# Patient Record
Sex: MALE | Race: WHITE | NOT HISPANIC OR LATINO | Employment: FULL TIME | URBAN - METROPOLITAN AREA
[De-identification: names, ages, dates, MRNs, and addresses within clinical notes are randomized per-mention and may not be internally consistent; named-entity substitution may affect disease eponyms.]

---

## 2019-07-06 ENCOUNTER — APPOINTMENT (EMERGENCY)
Dept: RADIOLOGY | Facility: HOSPITAL | Age: 44
End: 2019-07-06
Payer: OTHER MISCELLANEOUS

## 2019-07-06 ENCOUNTER — HOSPITAL ENCOUNTER (EMERGENCY)
Facility: HOSPITAL | Age: 44
Discharge: HOME | End: 2019-07-06
Attending: EMERGENCY MEDICINE | Admitting: EMERGENCY MEDICINE
Payer: OTHER MISCELLANEOUS

## 2019-07-06 VITALS
WEIGHT: 210 LBS | HEART RATE: 92 BPM | RESPIRATION RATE: 20 BRPM | DIASTOLIC BLOOD PRESSURE: 89 MMHG | OXYGEN SATURATION: 100 % | SYSTOLIC BLOOD PRESSURE: 151 MMHG | TEMPERATURE: 98 F

## 2019-07-06 DIAGNOSIS — M54.30 SCIATICA: ICD-10-CM
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
## 2019-07-06 DIAGNOSIS — M54.50 LOW BACK PAIN: ICD-10-CM

## 2019-07-06 LAB
BILIRUB UR QL STRIP: NEGATIVE
CLARITY UR: CLEAR
COLOR UR: YELLOW
GLUCOSE UR STRIP-MCNC: NEGATIVE MG/DL
HGB UR QL STRIP.AUTO: NEGATIVE
KETONES UR STRIP-MCNC: NEGATIVE MG/DL
LEUKOCYTE ESTERASE UR QL STRIP: NEGATIVE
NITRITE UR QL STRIP: NEGATIVE
PH UR STRIP.AUTO: 6 [PH]
PROT UR STRIP-MCNC: NEGATIVE MG/DL
SP GR UR STRIP.AUTO: 1.02 (ref 1–1.04)
UROBILINOGEN UA: NEGATIVE MG/DL

## 2019-07-06 PROCEDURE — 99283 EMERGENCY DEPT VISIT LOW MDM: CPT | Performed by: PHYSICIAN ASSISTANT

## 2019-07-06 PROCEDURE — 81003 URINALYSIS AUTO W/O SCOPE: CPT | Performed by: PHYSICIAN ASSISTANT

## 2019-07-06 PROCEDURE — 99284 EMERGENCY DEPT VISIT MOD MDM: CPT

## 2019-07-06 PROCEDURE — 72100 X-RAY EXAM L-S SPINE 2/3 VWS: CPT

## 2019-07-06 RX ORDER — METHOCARBAMOL 500 MG/1
500 TABLET, FILM COATED ORAL 2 TIMES DAILY
Qty: 20 TABLET | Refills: 0 | Status: SHIPPED | OUTPATIENT
Start: 2019-07-06

## 2019-07-06 RX ORDER — IBUPROFEN 600 MG/1
600 TABLET ORAL EVERY 6 HOURS PRN
Qty: 30 TABLET | Refills: 0 | Status: SHIPPED | OUTPATIENT
Start: 2019-07-06

## 2019-07-06 RX ORDER — ASCORBIC ACID 100 MG
TABLET,CHEWABLE ORAL
COMMUNITY

## 2019-07-06 RX ORDER — ALBUTEROL SULFATE 90 UG/1
AEROSOL, METERED RESPIRATORY (INHALATION)
COMMUNITY
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
## 2019-07-06 NOTE — ED ATTENDING ATTESTATION
I, 22 Naun Blackwell DO, saw and evaluated the patient  I have discussed the patient with the resident/non-physician practitioner and agree with the resident's/non-physician practitioner's findings, Plan of Care, and MDM as documented in the resident's/non-physician practitioner's note, except where noted  All available labs and Radiology studies were reviewed  I was present for key portions of any procedure(s) performed by the resident/non-physician practitioner and I was immediately available to provide assistance  At this point I agree with the current assessment done in the Emergency Department    I have conducted an independent evaluation of this patient a history and physical is as follows:      Critical Care Time  Procedures

## 2022-01-18 ENCOUNTER — APPOINTMENT (EMERGENCY)
Dept: RADIOLOGY | Facility: HOSPITAL | Age: 47
End: 2022-01-18
Payer: COMMERCIAL

## 2022-01-18 ENCOUNTER — HOSPITAL ENCOUNTER (EMERGENCY)
Facility: HOSPITAL | Age: 47
Discharge: HOME | End: 2022-01-18
Attending: EMERGENCY MEDICINE
Payer: COMMERCIAL

## 2022-01-18 VITALS
TEMPERATURE: 98.6 F | OXYGEN SATURATION: 99 % | HEART RATE: 97 BPM | RESPIRATION RATE: 18 BRPM | DIASTOLIC BLOOD PRESSURE: 96 MMHG | SYSTOLIC BLOOD PRESSURE: 158 MMHG

## 2022-01-18 DIAGNOSIS — S90.122A CONTUSION OF LESSER TOE OF LEFT FOOT WITHOUT DAMAGE TO NAIL, INITIAL ENCOUNTER: Primary | ICD-10-CM

## 2022-01-18 PROCEDURE — 99284 EMERGENCY DEPT VISIT MOD MDM: CPT | Performed by: EMERGENCY MEDICINE

## 2022-01-18 PROCEDURE — 73630 X-RAY EXAM OF FOOT: CPT

## 2022-01-18 PROCEDURE — 99283 EMERGENCY DEPT VISIT LOW MDM: CPT

## 2022-01-18 NOTE — Clinical Note
Delmer Real was seen and treated in our emergency department on 1/18/2022  Diagnosis:     Alva Cleaning  may return to work on return date  He may return on this date: 01/18/2022         If you have any questions or concerns, please don't hesitate to call        Alicia Sinclair MD    ______________________________           _______________          _______________  Hospital Representative                              Date                                Time

## 2022-01-18 NOTE — ED PROVIDER NOTES
History  Chief Complaint   Patient presents with    Toe Injury     Left 5th digit injury occurred today  No meds PTA  27-year-old male with no significant past medical history presenting for evaluation of left toe injury  Patient is a  and was working when a litter ran over his toe  Patient has pain and ecchymosis to his left 5th toe that is worse with movement  He is able to ambulate  No other additional trauma or injury  He did not take anything for pain prior to arrival           Prior to Admission Medications   Prescriptions Last Dose Informant Patient Reported? Taking? Ascorbic Acid (VITAMIN C) 100 MG CHEW   Yes Yes   Sig: Vitamin C   Cholecalciferol (VITAMIN D3 PO)   Yes Yes   Sig: Vitamin D3   CoenzymeQ10-Isoleucine-Glycine (CO Q-10) 100-50-25 MG TB24   Yes Yes   Sig: Co Q-10   Multiple Vitamins-Minerals (DAILY MULTIVITAMIN PO)   Yes Yes   Sig: Daily Multivitamin   Omega-3 Fatty Acids (FISH OIL OMEGA-3 PO)   Yes Yes   Sig: Take 2 g by mouth daily   albuterol (PROAIR HFA) 90 mcg/act inhaler Not Taking at Unknown time  Yes No   Sig: ProAir HFA 90 mcg/actuation aerosol inhaler   Patient not taking: Reported on 1/18/2022   ibuprofen (MOTRIN) 600 mg tablet Not Taking at Unknown time  No No   Sig: Take 1 tablet (600 mg total) by mouth every 6 (six) hours as needed for mild pain   Patient not taking: Reported on 1/18/2022    methocarbamol (ROBAXIN) 500 mg tablet Not Taking at Unknown time  No No   Sig: Take 1 tablet (500 mg total) by mouth 2 (two) times a day   Patient not taking: Reported on 1/18/2022       Facility-Administered Medications: None       Past Medical History:   Diagnosis Date    Back injury        History reviewed  No pertinent surgical history  History reviewed  No pertinent family history  I have reviewed and agree with the history as documented      E-Cigarette/Vaping     E-Cigarette/Vaping Substances     Social History     Tobacco Use    Smoking status: Never Smoker  Smokeless tobacco: Never Used   Substance Use Topics    Alcohol use: Yes     Comment: "occasionally"    Drug use: Never       Review of Systems   Constitutional: Negative for fever  HENT: Negative for congestion, rhinorrhea and sore throat  Eyes: Negative for visual disturbance  Respiratory: Negative for cough and shortness of breath  Cardiovascular: Negative for chest pain, palpitations and leg swelling  Gastrointestinal: Negative for abdominal pain, diarrhea, nausea and vomiting  Genitourinary: Negative for dysuria and hematuria  Musculoskeletal: Positive for arthralgias  Negative for myalgias  Skin: Negative for color change and rash  Neurological: Negative for weakness and numbness  Hematological: Does not bruise/bleed easily  Physical Exam  Physical Exam  Vitals and nursing note reviewed  Constitutional:       General: He is not in acute distress  Appearance: Normal appearance  HENT:      Head: Normocephalic and atraumatic  Nose: Nose normal    Eyes:      General: No scleral icterus  Extraocular Movements: Extraocular movements intact  Conjunctiva/sclera: Conjunctivae normal       Pupils: Pupils are equal, round, and reactive to light  Cardiovascular:      Rate and Rhythm: Normal rate and regular rhythm  Pulmonary:      Effort: Pulmonary effort is normal  No respiratory distress  Breath sounds: No wheezing, rhonchi or rales  Abdominal:      General: There is no distension  Palpations: Abdomen is soft  Musculoskeletal:         General: Swelling (left fifth toe) and tenderness (left fifth toe, DIP joint) present  No deformity  Cervical back: Neck supple  Right lower leg: No edema  Left lower leg: No edema  Comments: No tenderness to base of first or fifth metatarsals  No ankle, tibial, or fibula tenderness  Good distal pulses and capillary refill   Skin:     General: Skin is warm and dry        Findings: Bruising (left medial 5th toe) present  No rash  Neurological:      General: No focal deficit present  Mental Status: He is alert and oriented to person, place, and time  Mental status is at baseline  Motor: No weakness  Gait: Gait abnormal (antalgic)  Psychiatric:         Mood and Affect: Mood normal          Behavior: Behavior normal          Vital Signs  ED Triage Vitals [01/18/22 1133]   Temperature Pulse Respirations Blood Pressure SpO2   98 6 °F (37 °C) 97 18 158/96 99 %      Temp Source Heart Rate Source Patient Position - Orthostatic VS BP Location FiO2 (%)   Tympanic Monitor Sitting Right arm --      Pain Score       --           Vitals:    01/18/22 1133   BP: 158/96   Pulse: 97   Patient Position - Orthostatic VS: Sitting         Visual Acuity      ED Medications  Medications - No data to display    Diagnostic Studies  Results Reviewed     None                 XR foot 3+ views LEFT   ED Interpretation by Leo Barahona MD (01/18 1202)   No acute fracture                 Procedures  Procedures         ED Course                               SBIRT 22yo+      Most Recent Value   SBIRT (25 yo +)    In order to provide better care to our patients, we are screening all of our patients for alcohol and drug use  Would it be okay to ask you these screening questions? No Filed at: 01/18/2022 1141                    MDM  Number of Diagnoses or Management Options  Contusion of lesser toe of left foot without damage to nail, initial encounter  Diagnosis management comments: 51-year-old male presenting for evaluation of traumatic toe injury  Differential diagnosis includes fracture, dislocation, contusion, soft tissue injury  Patient has an intact neurovascular examination  X-rays were obtained and per my interpretation are negative for acute fracture  These are notable for osteoarthritic changes  Final interpretation by radiology is pending  Patient offered pain medication but declined    He is appropriate for discharge home with outpatient follow-up  Patient is medically appropriate to return to work without restrictions  Return precautions discussed  Patient counseled on rest, ice, elevation, symptomatic management at home  He is in agreement and understanding of these instructions  Disposition  Final diagnoses:   Contusion of lesser toe of left foot without damage to nail, initial encounter     Time reflects when diagnosis was documented in both MDM as applicable and the Disposition within this note     Time User Action Codes Description Comment    1/18/2022 12:05 PM Martha Huddleston Add [X70 122A] Contusion of lesser toe of left foot without damage to nail, initial encounter       ED Disposition     ED Disposition Condition Date/Time Comment    Discharge Stable Tue Jan 18, 2022 12:05 PM Melodie Pineda discharge to home/self care              Follow-up Information     Follow up With Specialties Details Why Contact Info    Follow up with your primary care doctor as needed              Discharge Medication List as of 1/18/2022 12:06 PM      CONTINUE these medications which have NOT CHANGED    Details   Ascorbic Acid (VITAMIN C) 100 MG CHEW Vitamin C, Historical Med      Cholecalciferol (VITAMIN D3 PO) Vitamin D3, Historical Med      CoenzymeQ10-Isoleucine-Glycine (CO Q-10) 100-50-25 MG TB24 Co Q-10, Historical Med      Multiple Vitamins-Minerals (DAILY MULTIVITAMIN PO) Daily Multivitamin, Historical Med      Omega-3 Fatty Acids (FISH OIL OMEGA-3 PO) Take 2 g by mouth daily, Historical Med      albuterol (PROAIR HFA) 90 mcg/act inhaler ProAir HFA 90 mcg/actuation aerosol inhaler, Historical Med      ibuprofen (MOTRIN) 600 mg tablet Take 1 tablet (600 mg total) by mouth every 6 (six) hours as needed for mild pain, Starting Sat 7/6/2019, Print      methocarbamol (ROBAXIN) 500 mg tablet Take 1 tablet (500 mg total) by mouth 2 (two) times a day, Starting Sat 7/6/2019, Print             No discharge procedures on file      PDMP Review     None          ED Provider  Electronically Signed by           Annemarie Lu MD  01/18/22 4592

## 2023-12-21 ENCOUNTER — OCCMED (OUTPATIENT)
Dept: URGENT CARE | Facility: CLINIC | Age: 48
End: 2023-12-21
Payer: OTHER MISCELLANEOUS

## 2023-12-21 DIAGNOSIS — Z20.1 EXPOSURE TO TB: Primary | ICD-10-CM

## 2023-12-21 PROCEDURE — G0382 LEV 3 HOSP TYPE B ED VISIT: HCPCS | Performed by: PHYSICIAN ASSISTANT

## 2023-12-21 PROCEDURE — 86480 TB TEST CELL IMMUN MEASURE: CPT | Performed by: PHYSICIAN ASSISTANT

## 2023-12-21 PROCEDURE — 99283 EMERGENCY DEPT VISIT LOW MDM: CPT | Performed by: PHYSICIAN ASSISTANT

## 2023-12-22 LAB
GAMMA INTERFERON BACKGROUND BLD IA-ACNC: 0 IU/ML
M TB IFN-G BLD-IMP: NEGATIVE
M TB IFN-G CD4+ BCKGRND COR BLD-ACNC: 0 IU/ML
M TB IFN-G CD4+ BCKGRND COR BLD-ACNC: 0 IU/ML
MITOGEN IGNF BCKGRD COR BLD-ACNC: 8.01 IU/ML

## 2024-06-24 ENCOUNTER — HOSPITAL ENCOUNTER (EMERGENCY)
Facility: HOSPITAL | Age: 49
Discharge: HOME | End: 2024-06-24
Attending: EMERGENCY MEDICINE | Admitting: EMERGENCY MEDICINE
Payer: OTHER MISCELLANEOUS

## 2024-06-24 ENCOUNTER — APPOINTMENT (EMERGENCY)
Dept: RADIOLOGY | Facility: HOSPITAL | Age: 49
End: 2024-06-24
Payer: OTHER MISCELLANEOUS

## 2024-06-24 VITALS
DIASTOLIC BLOOD PRESSURE: 102 MMHG | TEMPERATURE: 98 F | SYSTOLIC BLOOD PRESSURE: 159 MMHG | HEART RATE: 72 BPM | RESPIRATION RATE: 18 BRPM | OXYGEN SATURATION: 100 %
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
## 2024-06-24 DIAGNOSIS — S60.229A HAND CONTUSION: Primary | ICD-10-CM

## 2024-06-24 PROCEDURE — 99284 EMERGENCY DEPT VISIT MOD MDM: CPT

## 2024-06-24 PROCEDURE — 99283 EMERGENCY DEPT VISIT LOW MDM: CPT

## 2024-06-24 PROCEDURE — 73130 X-RAY EXAM OF HAND: CPT

## 2024-06-25 NOTE — DISCHARGE INSTRUCTIONS
Your x-ray looks normal.  Since the dog bite did not break skin you do not need any antibiotics, rabies prophylaxis, tetanus update, etc.  Treat supportively with ice, ibuprofen.

## 2024-06-25 NOTE — ED PROVIDER NOTES
"History  Chief Complaint   Patient presents with    Dog Bite     Pt states he was bit by dog on right hand. No puncture wound but pain all in hand     -year-old male presents for evaluation after a dog bite.  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  He has no wounds on the affected hand, did not break skin.  Complaining of pain around his right fourth and fifth metacarpals.  No numbness, tingling, weakness.        Prior to Admission Medications   Prescriptions Last Dose Informant Patient Reported? Taking?   Ascorbic Acid (VITAMIN C) 100 MG CHEW   Yes No   Sig: Vitamin C   Cholecalciferol (VITAMIN D3 PO)   Yes No   Sig: Vitamin D3   CoenzymeQ10-Isoleucine-Glycine (CO Q-10) 100-50-25 MG TB24   Yes No   Sig: Co Q-10   Multiple Vitamins-Minerals (DAILY MULTIVITAMIN PO)   Yes No   Sig: Daily Multivitamin   Omega-3 Fatty Acids (FISH OIL OMEGA-3 PO)   Yes No   Sig: Take 2 g by mouth daily   albuterol (PROAIR HFA) 90 mcg/act inhaler   Yes No   Sig: ProAir HFA 90 mcg/actuation aerosol inhaler   Patient not taking: Reported on 1/18/2022   ibuprofen (MOTRIN) 600 mg tablet   No No   Sig: Take 1 tablet (600 mg total) by mouth every 6 (six) hours as needed for mild pain   Patient not taking: Reported on 1/18/2022    methocarbamol (ROBAXIN) 500 mg tablet   No No   Sig: Take 1 tablet (500 mg total) by mouth 2 (two) times a day   Patient not taking: Reported on 1/18/2022       Facility-Administered Medications: None       Past Medical History:   Diagnosis Date    Back injury        History reviewed. No pertinent surgical history.    History reviewed. No pertinent family history.  I have reviewed and agree with the history as documented.    E-Cigarette/Vaping     E-Cigarette/Vaping Substances     Social History     Tobacco Use    Smoking status: Never    Smokeless tobacco: Never   Substance Use Topics    Alcohol use: Yes     Comment: \"occasionally\"    " Drug use: Never       Review of Systems   Constitutional:  Negative for chills and fever.   HENT:  Negative for ear pain and sore throat.    Eyes:  Negative for pain and visual disturbance.   Respiratory:  Negative for cough and shortness of breath.    Cardiovascular:  Negative for chest pain and palpitations.   Gastrointestinal:  Negative for abdominal pain and vomiting.   Genitourinary:  Negative for dysuria and hematuria.   Musculoskeletal:  Positive for myalgias. Negative for arthralgias and back pain.   Skin:  Negative for color change and rash.   Neurological:  Negative for seizures and syncope.   All other systems reviewed and are negative.      Physical Exam  Physical Exam  Vitals and nursing note reviewed.   Constitutional:       General: He is not in acute distress.     Appearance: He is well-developed.   HENT:      Head: Normocephalic and atraumatic.   Eyes:      Conjunctiva/sclera: Conjunctivae normal.   Cardiovascular:      Rate and Rhythm: Normal rate and regular rhythm.      Heart sounds: No murmur heard.  Pulmonary:      Effort: Pulmonary effort is normal. No respiratory distress.      Breath sounds: Normal breath sounds.   Abdominal:      Palpations: Abdomen is soft.      Tenderness: There is no abdominal tenderness.   Musculoskeletal:         General: No swelling.      Right hand: Tenderness present. No swelling, deformity, lacerations or bony tenderness. Normal range of motion. Normal strength. Normal sensation. There is no disruption of two-point discrimination. Normal capillary refill. Normal pulse.      Left hand: Normal.      Cervical back: Neck supple.      Comments: No puncture marks or open wounds.   Skin:     General: Skin is warm and dry.      Capillary Refill: Capillary refill takes less than 2 seconds.   Neurological:      Mental Status: He is alert.   Psychiatric:         Mood and Affect: Mood normal.         Vital Signs  ED Triage Vitals   Temperature Pulse Respirations Blood Pressure  SpO2   06/24/24 1947 06/24/24 1947 06/24/24 1949 06/24/24 1947 06/24/24 1947   98 °F (36.7 °C) 72 18 (!) 159/102 100 %      Temp Source Heart Rate Source Patient Position - Orthostatic VS BP Location FiO2 (%)   06/24/24 1947 06/24/24 1947 06/24/24 1947 06/24/24 1947 --   Oral Monitor Sitting Right arm       Pain Score       --                  Vitals:    06/24/24 1947   BP: (!) 159/102   Pulse: 72   Patient Position - Orthostatic VS: Sitting         Visual Acuity      ED Medications  Medications - No data to display    Diagnostic Studies  Results Reviewed       None                   XR hand 3+ views RIGHT   ED Interpretation by Carl Beck PA-C (06/24 2104)   ED wet read: No acute osseous abnormality.                 Procedures  Procedures         ED Course                               SBIRT 22yo+      Flowsheet Row Most Recent Value   Initial Alcohol Screen: US AUDIT-C     1. How often do you have a drink containing alcohol? 0 Filed at: 06/24/2024 1951   2. How many drinks containing alcohol do you have on a typical day you are drinking?  0 Filed at: 06/24/2024 1951   3a. Male UNDER 65: How often do you have five or more drinks on one occasion? 0 Filed at: 06/24/2024 1951   Audit-C Score 0 Filed at: 06/24/2024 1951   [place]: How many times in the past year have you...    Used an illegal drug or used a prescription medication for non-medical reasons? Never Filed at: 06/24/2024 1951                      Medical Decision Making  49-year-old male presents for evaluation of dog bite.  On exam patient in NAD, no wounds noted to affected hand, skin was not broken.  Mild TTP around third and fourth metacarpal has a mild contusion.  X-ray of right hand shows no fractures.  Patient has full active ROM of right hand, neurovascularly intact.  Skin skin was not broken does not need antibiotics, tetanus, rabies prophylaxis.  Educated on supportive care and expectations.  Patient expresses understanding of the condition,  treatment plan, follow-up instructions, and return precautions.      Amount and/or Complexity of Data Reviewed  Radiology: ordered and independent interpretation performed.             Disposition  Final diagnoses:   Hand contusion   Dog bite, initial encounter     Time reflects when diagnosis was documented in both MDM as applicable and the Disposition within this note       Time User Action Codes Description Comment    6/24/2024  9:05 PM Carl Beck [S60.229A] Hand contusion     6/24/2024  9:05 PM Carl Beck Add [W54.0XXA] Dog bite, initial encounter           ED Disposition       ED Disposition   Discharge    Condition   Stable    Date/Time   Mon Jun 24, 2024 2105    Comment   Hiram Bandar discharge to home/self care.                   Follow-up Information    None         Discharge Medication List as of 6/24/2024  9:05 PM        CONTINUE these medications which have NOT CHANGED    Details   albuterol (PROAIR HFA) 90 mcg/act inhaler ProAir HFA 90 mcg/actuation aerosol inhaler, Historical Med      Ascorbic Acid (VITAMIN C) 100 MG CHEW Vitamin C, Historical Med      Cholecalciferol (VITAMIN D3 PO) Vitamin D3, Historical Med      CoenzymeQ10-Isoleucine-Glycine (CO Q-10) 100-50-25 MG TB24 Co Q-10, Historical Med      ibuprofen (MOTRIN) 600 mg tablet Take 1 tablet (600 mg total) by mouth every 6 (six) hours as needed for mild pain, Starting Sat 7/6/2019, Print      methocarbamol (ROBAXIN) 500 mg tablet Take 1 tablet (500 mg total) by mouth 2 (two) times a day, Starting Sat 7/6/2019, Print      Multiple Vitamins-Minerals (DAILY MULTIVITAMIN PO) Daily Multivitamin, Historical Med      Omega-3 Fatty Acids (FISH OIL OMEGA-3 PO) Take 2 g by mouth daily, Historical Med             No discharge procedures on file.    PDMP Review       None            ED Provider  Electronically Signed by             Carl Beck PA-C  06/25/24 2870

## 2025-03-05 ENCOUNTER — HOSPITAL ENCOUNTER (OUTPATIENT)
Dept: HOSPITAL 99 - RAD | Age: 50
End: 2025-03-05
Payer: COMMERCIAL

## 2025-03-05 DIAGNOSIS — M77.11: Primary | ICD-10-CM
